# Patient Record
Sex: FEMALE | Race: BLACK OR AFRICAN AMERICAN | NOT HISPANIC OR LATINO | URBAN - METROPOLITAN AREA
[De-identification: names, ages, dates, MRNs, and addresses within clinical notes are randomized per-mention and may not be internally consistent; named-entity substitution may affect disease eponyms.]

---

## 2018-05-06 ENCOUNTER — EMERGENCY (EMERGENCY)
Facility: HOSPITAL | Age: 18
LOS: 0 days | Discharge: HOME | End: 2018-05-06
Attending: EMERGENCY MEDICINE | Admitting: EMERGENCY MEDICINE

## 2018-05-06 ENCOUNTER — TRANSCRIPTION ENCOUNTER (OUTPATIENT)
Age: 18
End: 2018-05-06

## 2018-05-06 VITALS
RESPIRATION RATE: 18 BRPM | TEMPERATURE: 98 F | DIASTOLIC BLOOD PRESSURE: 72 MMHG | HEART RATE: 70 BPM | SYSTOLIC BLOOD PRESSURE: 115 MMHG | OXYGEN SATURATION: 100 %

## 2018-05-06 VITALS — WEIGHT: 126.99 LBS

## 2018-05-06 DIAGNOSIS — R10.32 LEFT LOWER QUADRANT PAIN: ICD-10-CM

## 2018-05-06 NOTE — ED PROVIDER NOTE - OBJECTIVE STATEMENT
17yo f no sig pmhx presents CC left sided groin pain and palpable bulge with ambulation and upon standing. pt reports pain preceding bulge for about one week now 17yo f no sig pmhx presents CC left sided groin pain and palpable bulge with ambulation and upon standing. pt reports pain preceding bulge for about one week now, first noticed bulge on friday. no fevers, nausea, vomiting, rashes, abdominal pain, dysuria, diarrhea. no prior surgeries. nkda

## 2018-05-06 NOTE — ED PEDIATRIC NURSE NOTE - OBJECTIVE STATEMENT
Patient sent [place] [facility] [place] bump to left groin. Patient denies any fevers, chills

## 2018-05-06 NOTE — ED PROVIDER NOTE - ATTENDING CONTRIBUTION TO CARE
patient is a 17 yo  F presenting to the ED for evaluation of a bump that she felt in her groin. she states that she noticed the bump last week, there was no fever no other concerns, but now when she presses on it, she feels pain and she wanted to get it evaluated. she denies any fever, no vaginal discharge, no other concerns.   On exam  Exam-Vitals reviewed  well appearing child, in no acute distress  TM’s clear, [place] landmarks visualized bilaterally , no bulging, no erythema, light reflex normal, no   Heart- Regular rate and rhythm, S1S2 normal, no murmurs, rubs, or gallops  Lungs- clear to auscultation bilaterally,  no wheeze, no rhonchi.   Abdomen soft, non tender and non distended, no organomegaly, no masses.   MSK- FROM x all joints.   UE/LE- no rash.    Plan  will obtain US of the area  will reassess

## 2018-05-06 NOTE — ED PROVIDER NOTE - PROGRESS NOTE DETAILS
pending us of tender inguinal area patient here for what appears left inguinal lymphnode that is tender to touch about 1cm in dm, mobile, awaiting US. case signed out to Dr. Allen results reviewed. will print for patient, pt instructed to follow up with pmd regarding enlarged lymph node, expresses understanding for this. dc plan, return precautions and need for f/u discussed Pt evaluated after sign out. Results reviewed with pt. Will discharge with PMD follow up.

## 2018-05-06 NOTE — ED PROVIDER NOTE - NS ED ROS FT
General: No fevers, chills, nausea, vomiting  Eyes:  No visual changes, eye pain or discharge.  ENMT:  No hearing changes, pain, no sore throat or runny nose, no difficulty swallowing  Cardiac:  No chest pain, SOB or edema. No chest pain with exertion.  Respiratory:  No cough or respiratory distress. No history of asthma or RAD.  GI:  No nausea, vomiting, diarrhea or abdominal pain.  :  No dysuria, frequency or burning.  MS:  No muscle weakness, joint pain or back pain.  Neuro:  No headache or weakness.  No LOC.  Skin:  No skin rash.   Endocrine: No history of thyroid disease or diabetes.

## 2018-05-06 NOTE — ED PROVIDER NOTE - PHYSICAL EXAMINATION
CONSTITUTIONAL: Well-developed; well-nourished; in no acute distress.   SKIN: warm, dry  HEAD: Normocephalic; atraumatic.  EYES: PERRL, EOMI, no conjunctival erythema  ENT: No nasal discharge; airway clear, mucous membranes moist  NECK: Supple; non tender.  CARD: +S1, S2 no murmurs, gallops, or rubs. Regular rate and rhythm.   RESP: No wheezes, rales or rhonchi. CTABL  ABD: soft ntnd  EXT: moves all extremities, ambulates wo assistance No clubbing, cyanosis or edema. area ttp in left inguinal region, no erythema, edema, ecchymosis, discharge in the tender area. no palpable bulge appreciated. bed side US fails to show abscess or cyst.   NEURO: Alert, oriented, grossly unremarkable  PSYCH: Cooperative, appropriate.

## 2019-03-22 ENCOUNTER — APPOINTMENT (OUTPATIENT)
Dept: ANTEPARTUM | Facility: CLINIC | Age: 19
End: 2019-03-22

## 2019-03-22 ENCOUNTER — OUTPATIENT (OUTPATIENT)
Dept: OUTPATIENT SERVICES | Facility: HOSPITAL | Age: 19
LOS: 1 days | Discharge: HOME | End: 2019-03-22

## 2019-03-22 PROBLEM — Z00.00 ENCOUNTER FOR PREVENTIVE HEALTH EXAMINATION: Status: ACTIVE | Noted: 2019-03-22

## 2019-04-15 ENCOUNTER — APPOINTMENT (OUTPATIENT)
Dept: ANTEPARTUM | Facility: CLINIC | Age: 19
End: 2019-04-15

## 2019-04-15 ENCOUNTER — OUTPATIENT (OUTPATIENT)
Dept: OUTPATIENT SERVICES | Facility: HOSPITAL | Age: 19
LOS: 1 days | Discharge: HOME | End: 2019-04-15

## 2019-07-23 ENCOUNTER — APPOINTMENT (OUTPATIENT)
Dept: ANTEPARTUM | Facility: CLINIC | Age: 19
End: 2019-07-23
Payer: COMMERCIAL

## 2019-07-23 ENCOUNTER — OUTPATIENT (OUTPATIENT)
Dept: OUTPATIENT SERVICES | Facility: HOSPITAL | Age: 19
LOS: 1 days | Discharge: HOME | End: 2019-07-23

## 2019-07-23 PROCEDURE — 76816 OB US FOLLOW-UP PER FETUS: CPT | Mod: 26

## 2019-07-30 DIAGNOSIS — Z36.89 ENCOUNTER FOR OTHER SPECIFIED ANTENATAL SCREENING: ICD-10-CM

## 2019-07-30 DIAGNOSIS — O26.843 UTERINE SIZE-DATE DISCREPANCY, THIRD TRIMESTER: ICD-10-CM

## 2019-07-30 DIAGNOSIS — Z3A.35 35 WEEKS GESTATION OF PREGNANCY: ICD-10-CM

## 2025-06-01 ENCOUNTER — EMERGENCY (EMERGENCY)
Facility: HOSPITAL | Age: 25
LOS: 0 days | Discharge: LEFT BEFORE DISCHARGE | End: 2025-06-01
Payer: MEDICAID

## 2025-06-01 VITALS
OXYGEN SATURATION: 99 % | DIASTOLIC BLOOD PRESSURE: 76 MMHG | WEIGHT: 173.94 LBS | HEART RATE: 80 BPM | TEMPERATURE: 98 F | RESPIRATION RATE: 18 BRPM | SYSTOLIC BLOOD PRESSURE: 114 MMHG

## 2025-06-01 DIAGNOSIS — Z53.21 PROCEDURE AND TREATMENT NOT CARRIED OUT DUE TO PATIENT LEAVING PRIOR TO BEING SEEN BY HEALTH CARE PROVIDER: ICD-10-CM

## 2025-06-01 DIAGNOSIS — R55 SYNCOPE AND COLLAPSE: ICD-10-CM

## 2025-06-01 PROCEDURE — L9991: CPT

## 2025-06-01 PROCEDURE — 82962 GLUCOSE BLOOD TEST: CPT

## 2025-06-01 PROCEDURE — 93010 ELECTROCARDIOGRAM REPORT: CPT

## 2025-06-01 PROCEDURE — 93005 ELECTROCARDIOGRAM TRACING: CPT

## 2025-06-01 NOTE — ED ADULT TRIAGE NOTE - CHIEF COMPLAINT QUOTE
pt's boyfriend sts she passed out two times prior to coming in here, and as per patient she states I didn't pass out I was sleeping pt's boyfriend sts she passed out two times prior to coming in here, and as per patient she states I didn't pass out I was sleeping. FS in triage is 104 mg/dl